# Patient Record
Sex: FEMALE | URBAN - METROPOLITAN AREA
[De-identification: names, ages, dates, MRNs, and addresses within clinical notes are randomized per-mention and may not be internally consistent; named-entity substitution may affect disease eponyms.]

---

## 2021-11-30 ENCOUNTER — OFFICE VISIT (OUTPATIENT)
Dept: URBAN - METROPOLITAN AREA CLINIC 71 | Facility: CLINIC | Age: 71
End: 2021-11-30
Payer: MEDICARE

## 2021-11-30 DIAGNOSIS — H25.13 AGE-RELATED NUCLEAR CATARACT, BILATERAL: ICD-10-CM

## 2021-11-30 DIAGNOSIS — H35.3132 NONEXUDATIVE AGE-RELATED MACULAR DEGENERATION, BILATERAL, INTERMEDIATE DRY STAGE: ICD-10-CM

## 2021-11-30 PROCEDURE — 92133 CPTRZD OPH DX IMG PST SGM ON: CPT | Performed by: OPHTHALMOLOGY

## 2021-11-30 PROCEDURE — 92004 COMPRE OPH EXAM NEW PT 1/>: CPT | Performed by: OPHTHALMOLOGY

## 2021-11-30 PROCEDURE — 92134 CPTRZ OPH DX IMG PST SGM RTA: CPT | Performed by: OPHTHALMOLOGY

## 2021-11-30 RX ORDER — TIMOLOL 5 MG/ML
0.5 % SOLUTION/ DROPS OPHTHALMIC
Qty: 0 | Refills: 0 | Status: INACTIVE
Start: 2021-11-30 | End: 2021-11-30

## 2021-11-30 RX ORDER — LATANOPROST 50 UG/ML
0.005 % SOLUTION OPHTHALMIC
Qty: 0 | Refills: 0 | Status: INACTIVE
Start: 2021-11-30 | End: 2022-01-10

## 2021-11-30 RX ORDER — TIMOLOL MALEATE 6.8 MG/ML
0.5 % SOLUTION/ DROPS OPHTHALMIC
Qty: 10 | Refills: 3 | Status: ACTIVE
Start: 2021-11-30

## 2021-11-30 ASSESSMENT — INTRAOCULAR PRESSURE
OD: 15
OS: 19

## 2021-11-30 NOTE — IMPRESSION/PLAN
Impression: Primary open-angle glaucoma, bilateral, mild stage: H40.1131. OCT ordered by Dr Lillie Fink today. OCT 11/30/2021: Thinning OU. IOP OD doing well, slightly elevated OS. Plan: Discussed. Continue Latanoprost QHS OU and Timolol QAM OD. Begin Timolol QAM OS as well.  Will have patient return in 3-4 months for VF/IOP

## 2021-11-30 NOTE — IMPRESSION/PLAN
Impression: Nonexudative age-related macular degeneration, bilateral, intermediate dry stage: H35.3132. OCT 11/30/2021: no IRF/SRF Plan: Discussed. Continue taking eye vitamins and checking Amsler grid. Contact office with any changes in vision.  Will continue to monitor

## 2022-01-25 ENCOUNTER — OFFICE VISIT (OUTPATIENT)
Dept: URBAN - METROPOLITAN AREA CLINIC 71 | Facility: CLINIC | Age: 72
End: 2022-01-25
Payer: COMMERCIAL

## 2022-01-25 DIAGNOSIS — H52.4 PRESBYOPIA: Primary | ICD-10-CM

## 2022-01-25 PROCEDURE — 92310 CONTACT LENS FITTING OU: CPT | Performed by: OPTOMETRIST

## 2022-01-25 PROCEDURE — 92014 COMPRE OPH EXAM EST PT 1/>: CPT | Performed by: OPTOMETRIST

## 2022-01-25 ASSESSMENT — INTRAOCULAR PRESSURE
OD: 9
OS: 9

## 2022-01-25 ASSESSMENT — VISUAL ACUITY
OD: 20/40
OS: 20/40

## 2022-01-25 ASSESSMENT — KERATOMETRY
OS: 44.63
OD: 45.13

## 2022-01-25 NOTE — IMPRESSION/PLAN
Impression: Nonexudative age-related macular degeneration, bilateral, intermediate dry stage: H35.3132. Plan: The clinical exam is stable without evidence of progression. The patient was advised to continue AREDs multivitamins. The patient was also advised to continue to monitor the Amsler grid, avoid smoking, and call us immediately with any visual changes.

## 2022-01-25 NOTE — IMPRESSION/PLAN
Impression: Age-related nuclear cataract, bilateral: H25.13. Plan: stable. no treatment recommended at this time.  continue to monitor

## 2022-01-25 NOTE — IMPRESSION/PLAN
Impression: Primary open-angle glaucoma, bilateral, mild stage: H40.1131. Plan: IOP stable OU
current medication is best. Continue the Timolol QAM OU and Latanoprost QPM OU. 
Follow as planned with Dr. Harrington Sos

## 2022-01-25 NOTE — IMPRESSION/PLAN
Impression: Presbyopia: H52.4. Plan: Presbyopia is the inability to focus on objects (ie: accommodate) due to the loss of flexibility of your natural lens. Presbyopia occurs with age. Reading glasses, bifocals, trifocals or contacts can be helpful. Contact the office if difficulty focusing persists despite corrective eye wear. New glasses and CTL RX given today. Discussed due to the AMD i would recommend staying out of mono vision and using both eyes for distance to give her the best vision possible and using OTC readers. PT is ok with trying both eyes for distance but if it doesn't work for her she can switch back to mono vision. Advised PT she has limited acuity at 20/40 OU due to the combination of cataracts and AMD OU.

## 2022-02-25 ENCOUNTER — OFFICE VISIT (OUTPATIENT)
Dept: URBAN - METROPOLITAN AREA CLINIC 71 | Facility: CLINIC | Age: 72
End: 2022-02-25
Payer: COMMERCIAL

## 2022-02-25 DIAGNOSIS — H40.1131 PRIMARY OPEN-ANGLE GLAUCOMA, BILATERAL, MILD STAGE: Primary | ICD-10-CM

## 2022-02-25 PROCEDURE — 92083 EXTENDED VISUAL FIELD XM: CPT | Performed by: OPHTHALMOLOGY

## 2022-02-25 PROCEDURE — 99213 OFFICE O/P EST LOW 20 MIN: CPT | Performed by: OPHTHALMOLOGY

## 2022-02-25 ASSESSMENT — INTRAOCULAR PRESSURE
OD: 10
OS: 13

## 2022-02-25 NOTE — IMPRESSION/PLAN
Impression: Nonexudative age-related macular degeneration, bilateral, intermediate dry stage: H35.3132. Plan: The clinical exam is stable without evidence of progression. The patient was advised to continue AREDs multivitamins. The patient was also advised to continue to monitor the Amsler grid and call us immediately with any visual changes.

## 2022-05-27 ENCOUNTER — OFFICE VISIT (OUTPATIENT)
Dept: URBAN - METROPOLITAN AREA CLINIC 71 | Facility: CLINIC | Age: 72
End: 2022-05-27
Payer: COMMERCIAL

## 2022-05-27 DIAGNOSIS — H35.3132 NONEXUDATIVE AGE-RELATED MACULAR DEGENERATION, BILATERAL, INTERMEDIATE DRY STAGE: ICD-10-CM

## 2022-05-27 DIAGNOSIS — H40.1131 PRIMARY OPEN-ANGLE GLAUCOMA, BILATERAL, MILD STAGE: Primary | ICD-10-CM

## 2022-05-27 DIAGNOSIS — H25.13 AGE-RELATED NUCLEAR CATARACT, BILATERAL: ICD-10-CM

## 2022-05-27 DIAGNOSIS — H43.811 VITREOUS DEGENERATION, RIGHT EYE: ICD-10-CM

## 2022-05-27 PROCEDURE — 92134 CPTRZ OPH DX IMG PST SGM RTA: CPT | Performed by: OPHTHALMOLOGY

## 2022-05-27 PROCEDURE — 92014 COMPRE OPH EXAM EST PT 1/>: CPT | Performed by: OPHTHALMOLOGY

## 2022-05-27 PROCEDURE — 92133 CPTRZD OPH DX IMG PST SGM ON: CPT | Performed by: OPHTHALMOLOGY

## 2022-05-27 ASSESSMENT — INTRAOCULAR PRESSURE
OD: 11
OS: 14

## 2022-05-27 NOTE — IMPRESSION/PLAN
Impression: Nonexudative age-related macular degeneration, bilateral, intermediate dry stage: H35.3132. Plan: OCT ordered and clinical exam is stable without evidence of progression. Recommend continue the eye vitamins as prescribed.

## 2022-05-27 NOTE — IMPRESSION/PLAN
Impression: Vitreous degeneration, right eye: H43.811. Plan: There is no evidence of retinal pathology. All signs and risks of retinal detachment and tears were discussed in detail.

## 2022-05-27 NOTE — IMPRESSION/PLAN
Impression: Primary open-angle glaucoma, bilateral, mild stage: H40.1131. Plan: OCT ordered- RNFL 
IOP remaining stable on current treatment plan. No changes at this time. Continue the Timolol QAM OU and Latanoprost QPM OU. 
Recommend the next F/U be a VF

## 2022-12-02 ENCOUNTER — OFFICE VISIT (OUTPATIENT)
Dept: URBAN - METROPOLITAN AREA CLINIC 71 | Facility: CLINIC | Age: 72
End: 2022-12-02

## 2022-12-02 DIAGNOSIS — H40.1131 PRIMARY OPEN-ANGLE GLAUCOMA, BILATERAL, MILD STAGE: Primary | ICD-10-CM

## 2022-12-02 DIAGNOSIS — H25.13 AGE-RELATED NUCLEAR CATARACT, BILATERAL: ICD-10-CM

## 2022-12-02 PROCEDURE — 92083 EXTENDED VISUAL FIELD XM: CPT | Performed by: PHYSICIAN ASSISTANT

## 2022-12-02 PROCEDURE — 99213 OFFICE O/P EST LOW 20 MIN: CPT | Performed by: PHYSICIAN ASSISTANT

## 2022-12-02 ASSESSMENT — INTRAOCULAR PRESSURE
OD: 9
OS: 11

## 2022-12-02 NOTE — IMPRESSION/PLAN
Impression: Age-related nuclear cataract, bilateral: H25.13. Plan: Cataracts present OU. Discussed formation of cataracts and progression. No treatment currently recommended. The patient will monitor vision changes and contact us with any decrease in vision. Answered all questions and addressed all concerns.

## 2022-12-02 NOTE — IMPRESSION/PLAN
Impression: Primary open-angle glaucoma, bilateral, mild stage: H40.1131. Plan: Discussed that patient has glaucoma. Currently stable. Intraocular pressure well controlled, tolerating medications. Will continue with same regimen and continue to monitor. Patient to return to clinic as scheduled. Answered all questions and addressed all concerns. VF stable today. 

Reviewed previous OCT -- RNFL WNL OS with significant inferior RNFL loss OD

## 2023-01-19 ENCOUNTER — TESTING ONLY (OUTPATIENT)
Dept: URBAN - METROPOLITAN AREA CLINIC 71 | Facility: CLINIC | Age: 73
End: 2023-01-19

## 2023-01-19 DIAGNOSIS — H52.4 PRESBYOPIA: Primary | ICD-10-CM

## 2023-01-19 PROCEDURE — 92310 CONTACT LENS FITTING OU: CPT | Performed by: OPTOMETRIST

## 2023-01-19 ASSESSMENT — VISUAL ACUITY
OD: 20/40
OS: 20/40

## 2023-01-19 ASSESSMENT — KERATOMETRY
OS: 44.75
OD: 45.13

## 2023-01-19 ASSESSMENT — INTRAOCULAR PRESSURE
OS: 10
OD: 10

## 2023-01-19 NOTE — IMPRESSION/PLAN
Impression: Presbyopia: H52.4. Plan: Presbyopia is the inability to focus on objects (ie: accommodate) due to the loss of flexibility of your natural lens. Presbyopia occurs with age. Reading glasses, bifocals, trifocals or contacts can be helpful. Contact the office if difficulty focusing persists despite corrective eye wear. New glasses and CTL RX given today. changes in the prescription discussed. PT advised due to the AMD she is not a good candidate for the mono vision but we will give her a prescription for mono vision as well as distance contacts.  VA limited to 20/40 OU due to AMD.

## 2023-06-07 ENCOUNTER — OFFICE VISIT (OUTPATIENT)
Dept: URBAN - METROPOLITAN AREA CLINIC 71 | Facility: CLINIC | Age: 73
End: 2023-06-07
Payer: MEDICARE

## 2023-06-07 DIAGNOSIS — H52.4 PRESBYOPIA: ICD-10-CM

## 2023-06-07 DIAGNOSIS — H43.811 VITREOUS DEGENERATION, RIGHT EYE: ICD-10-CM

## 2023-06-07 DIAGNOSIS — H40.1131 PRIMARY OPEN-ANGLE GLAUCOMA, BILATERAL, MILD STAGE: ICD-10-CM

## 2023-06-07 DIAGNOSIS — H35.3132 NONEXUDATIVE AGE-RELATED MACULAR DEGENERATION, BILATERAL, INTERMEDIATE DRY STAGE: Primary | ICD-10-CM

## 2023-06-07 PROCEDURE — 99213 OFFICE O/P EST LOW 20 MIN: CPT | Performed by: OPTOMETRIST

## 2023-06-07 PROCEDURE — 92133 CPTRZD OPH DX IMG PST SGM ON: CPT | Performed by: OPTOMETRIST

## 2023-06-07 PROCEDURE — 92134 CPTRZ OPH DX IMG PST SGM RTA: CPT | Performed by: OPTOMETRIST

## 2023-06-07 ASSESSMENT — INTRAOCULAR PRESSURE
OS: 12
OD: 13

## 2023-06-07 ASSESSMENT — VISUAL ACUITY
OS: 20/40
OD: 20/40

## 2023-06-07 NOTE — IMPRESSION/PLAN
Impression: Nonexudative age-related macular degeneration, bilateral, intermediate dry stage: H35.3132. DRUSEN OU long standing no change in last year and half Pigmentary change stable over last year and half OD
PED OS long standing no change over last year and half. Had long conversation explaining how AMD is affecting vision. Patient was here wanting a better RX as she cannot see well. She just had RX in Jan 2023. Showed her her current rx and the power changes and how what she has is the best that is possible with lenses and her AMD acuity. Showed her how shifting gaze side to side helps as the center of her vision is damaged Even redid Manifest and got nearly the same exact thing as in Jan 2023. Explained this to pt as well. OD 20/40 OS 20/40  DVO  NVO 20/60 OU Plan: Refer to Dr Nelida Petersen ([facility]) for Consult for possible treatment.

## 2023-06-07 NOTE — IMPRESSION/PLAN
Impression: Primary open-angle glaucoma, bilateral, mild stage: H40.1131. RNFL thinning 
pt uses latanaprost and timilol for iop control Plan: Continue meds as prescribed Follow all appts

## 2023-06-07 NOTE — IMPRESSION/PLAN
Impression: Presbyopia: H52.4. New but not different by much RX DVO and NVO Pt wants a pair of distance glasses to wear over her MONO OD distance contact with OS near contact. Will have to re schedule and bring that pair of contacts to make that rx.   Pt was told that this will not make vision much better overall due to AMD Plan: Schedule appt with her wearing mono ctl's for distance ctls over her ctl

## 2023-06-14 ENCOUNTER — TESTING ONLY (OUTPATIENT)
Dept: URBAN - METROPOLITAN AREA CLINIC 71 | Facility: CLINIC | Age: 73
End: 2023-06-14

## 2023-06-14 DIAGNOSIS — H52.4 PRESBYOPIA: Primary | ICD-10-CM

## 2023-06-14 PROCEDURE — 92015 DETERMINE REFRACTIVE STATE: CPT | Performed by: OPTOMETRIST

## 2023-06-14 ASSESSMENT — VISUAL ACUITY
OD: 20/50
OS: 20/50

## 2023-06-14 ASSESSMENT — INTRAOCULAR PRESSURE
OD: 13
OS: 17

## 2023-06-23 ENCOUNTER — OFFICE VISIT (OUTPATIENT)
Dept: URBAN - METROPOLITAN AREA CLINIC 71 | Facility: CLINIC | Age: 73
End: 2023-06-23
Payer: MEDICARE

## 2023-06-23 DIAGNOSIS — H35.3132 NONEXUDATIVE AGE-RELATED MACULAR DEGENERATION, BILATERAL, INTERMEDIATE DRY STAGE: Primary | ICD-10-CM

## 2023-06-23 DIAGNOSIS — H40.1113 PRIMARY OPEN-ANGLE GLAUCOMA, SEVERE STAGE, RIGHT EYE: ICD-10-CM

## 2023-06-23 PROCEDURE — 92134 CPTRZ OPH DX IMG PST SGM RTA: CPT | Performed by: OPHTHALMOLOGY

## 2023-06-23 PROCEDURE — 92250 FUNDUS PHOTOGRAPHY W/I&R: CPT | Performed by: OPHTHALMOLOGY

## 2023-06-23 PROCEDURE — 99214 OFFICE O/P EST MOD 30 MIN: CPT | Performed by: OPHTHALMOLOGY

## 2023-06-23 ASSESSMENT — INTRAOCULAR PRESSURE
OS: 20
OD: 13

## 2023-06-23 NOTE — IMPRESSION/PLAN
Impression: Primary open-angle glaucoma, severe stage, right eye: H40.1113. Plan: 0.85 cupping w/ significant thinning of inferior pole, IOP 13 today. Continue IOP checks w/  April Livers.

## 2023-06-23 NOTE — IMPRESSION/PLAN
Impression: Nonexudative age-related macular degeneration, bilateral, intermediate dry stage: H35.3132. 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 Recommend ongoing q6 month evaluations. RTC 6 months DFEx/OCT mac/FP/FAF. AREDS 2 and Amsler grid checks.

## 2023-11-14 NOTE — IMPRESSION/PLAN
Impression: Primary open-angle glaucoma, bilateral, mild stage: H40.1131. Plan: VF today shows some central depression OU. OD>OS
IOP remaining stable on current treatment plan. No changes at this time. Continue the Timolol QAM OU and Latanoprost QPM OU. Recommend the next F/U be an AM IOP check. Patient is calling to check the status of [place] paperwork that  Ciera Hardy MD received.    The form had been faxed to the clinic on 11/10/2023.      Patient states that it is okay to leave a detailed message.      Patient Name: Gabriella Kelsey    Callback Number:  Mobile [phone]     Fax Number: unknown    Caller was advised that they will receive a call back from our clinic within 24-48 hours.

## 2024-01-19 ENCOUNTER — OFFICE VISIT (OUTPATIENT)
Dept: URBAN - METROPOLITAN AREA CLINIC 71 | Facility: CLINIC | Age: 74
End: 2024-01-19
Payer: MEDICARE

## 2024-01-19 DIAGNOSIS — H35.3132 NONEXUDATIVE AGE-RELATED MACULAR DEGENERATION, BILATERAL, INTERMEDIATE DRY STAGE: Primary | ICD-10-CM

## 2024-01-19 PROCEDURE — 99214 OFFICE O/P EST MOD 30 MIN: CPT | Performed by: OPHTHALMOLOGY

## 2024-01-19 PROCEDURE — 92250 FUNDUS PHOTOGRAPHY W/I&R: CPT | Performed by: OPHTHALMOLOGY

## 2024-01-19 PROCEDURE — 92134 CPTRZ OPH DX IMG PST SGM RTA: CPT | Performed by: OPHTHALMOLOGY

## 2024-01-19 ASSESSMENT — INTRAOCULAR PRESSURE
OS: 14
OD: 13

## 2025-04-09 ENCOUNTER — OFFICE VISIT (OUTPATIENT)
Dept: URBAN - METROPOLITAN AREA CLINIC 71 | Facility: CLINIC | Age: 75
End: 2025-04-09
Payer: MEDICARE

## 2025-04-09 DIAGNOSIS — H40.1113 PRIMARY OPEN-ANGLE GLAUCOMA, SEVERE STAGE, RIGHT EYE: ICD-10-CM

## 2025-04-09 DIAGNOSIS — H35.3193 NONEXUDATIVE AGE-RELATED MACULAR DEGENERATION, ADVANCED ATROPHIC W/O SUBFOVEAL INVOLVEMENT: Primary | ICD-10-CM

## 2025-04-09 DIAGNOSIS — H43.813 VITREOUS DEGENERATION, BILATERAL: ICD-10-CM

## 2025-04-09 DIAGNOSIS — H25.813 COMBINED FORMS OF AGE-RELATED CATARACT, BILATERAL: ICD-10-CM

## 2025-04-09 PROCEDURE — 92133 CPTRZD OPH DX IMG PST SGM ON: CPT

## 2025-04-09 PROCEDURE — 99213 OFFICE O/P EST LOW 20 MIN: CPT

## 2025-04-09 PROCEDURE — 92134 CPTRZ OPH DX IMG PST SGM RTA: CPT

## 2025-04-09 ASSESSMENT — INTRAOCULAR PRESSURE
OS: 9
OD: 10

## 2025-04-25 ENCOUNTER — OFFICE VISIT (OUTPATIENT)
Dept: URBAN - METROPOLITAN AREA CLINIC 71 | Facility: CLINIC | Age: 75
End: 2025-04-25

## 2025-04-25 DIAGNOSIS — H52.4 PRESBYOPIA: Primary | ICD-10-CM

## 2025-04-25 RX ORDER — TIMOLOL MALEATE 6.8 MG/ML
0.5 % SOLUTION/ DROPS OPHTHALMIC
Qty: 7 | Refills: 0 | Status: ACTIVE
Start: 2025-04-25

## 2025-04-25 ASSESSMENT — INTRAOCULAR PRESSURE
OS: 10
OD: 11

## 2025-04-25 ASSESSMENT — VISUAL ACUITY
OD: 20/60
OS: 20/50

## 2025-06-04 ENCOUNTER — OFFICE VISIT (OUTPATIENT)
Dept: URBAN - METROPOLITAN AREA CLINIC 71 | Facility: CLINIC | Age: 75
End: 2025-06-04

## 2025-06-04 DIAGNOSIS — H52.4 PRESBYOPIA: Primary | ICD-10-CM

## 2025-06-04 PROCEDURE — 92015 DETERMINE REFRACTIVE STATE: CPT
